# Patient Record
Sex: FEMALE | Race: WHITE | NOT HISPANIC OR LATINO | Employment: PART TIME | URBAN - METROPOLITAN AREA
[De-identification: names, ages, dates, MRNs, and addresses within clinical notes are randomized per-mention and may not be internally consistent; named-entity substitution may affect disease eponyms.]

---

## 2018-01-12 NOTE — PROGRESS NOTES
Assessment    1  UTI (urinary tract infection) (599 0) (N39 0)    Plan  Blood in urine, Burning with urination, Frequent urination    · Urine Dip Non-Automated- POC; Status:Complete;   Done: [id number] 02:40PM  UTI (urinary tract infection)    · Nitrofurantoin Monohyd Macro 100 MG Oral Capsule; TAKE 1 CAPSULE TWICE  DAILY UNTIL GONE   · Drink at least 6 glasses of clear liquids a day ; Status:Complete;   Done: 39CLU6833   · Drink plenty of fluids ; Status:Complete;   Done: 64TRT4146   · There are several things that can be done to treat and prevent bladder infections ;  Status:Complete;   Done: 01PJR5260   · Call [phone] if: The symptoms are not better in 2 days ; Status:Complete;   Done:  65FJQ4875   · Call [phone] if: The symptoms come back after the medications are finished ;  Status:Complete;   Done: 95COS2133   · Call [phone] if: You have pain in the kidney or low back area ; Status:Complete;    Done: 88DJY9177   · Call [phone] if: You see blood in your urine ; Status:Complete;   Done:  49XMS4408   · Call [phone] if: You start vomiting ; Status:Complete;   Done: 15SQT7564   · Call [phone] if: Your temperature is higher than 102F ; Status:Complete;   Done:  60QOT6513   · (1) URINE CULTURE; Source:Urine, Clean Catch; Status: In Progress - Specimen/Data  Collected;   Done: 71STL5056    Discussion/Summary    The patient has a urinary tract infection  She'll take antibiotic as ordered  She'll increase her fluids and rest  we will send her urine for culture as ordered  We'll followup with her if there's no improvement or if the condition worsens  She'll call there's no improvement by the end of the week  Possible side effects of new medications were reviewed with the patient/guardian today  The treatment plan was reviewed with the patient/guardian  The patient/guardian understands and agrees with the treatment plan      Chief Complaint  Pain and burning with urination  History of Present Illness  HPI: Alvy Peabody is a 78-year-old female presents today with urinary symptoms for 4 days  She is having increasing dysuria and increased frequency and urgency of urination  She is also having lower pelvic and low back discomfort  She does have some vaginal itching  She denies any nausea, vomiting, or fever  She denies any vaginal discharge  Her symptoms did seem to improve with increased fluid intake but came back this morning  Dysuria: Gurdeep Radford presents with complaints of dysuria  Associated symptoms include internal burning, urgency, frequency, hematuria, suprapubic pain, vaginal itching and lower back pain, but no fever, no chills, no vaginal discharge, no dyspareunia, no nocturia, no bladder spasm, no vaginal pain, no vulvar pain, no abdominal pain, no rectal pain, no nausea and no vomiting  Review of Systems    Constitutional: as noted in HPI    ENT: as noted in HPI  Cardiovascular: as noted in HPI  Respiratory: as noted in HPI  Breasts: as noted in HPI  Gastrointestinal: as noted in HPI  Genitourinary: as noted in HPI  Musculoskeletal: as noted in HPI  Integumentary: as noted in HPI  Neurological: as noted in HPI  Active Problems    1  Allergic rhinitis (477 9) (J30 9)   2  Chronic sinusitis (473 9) (J32 9)   3  Frequent urination (788 41) (R35 0)   4  Hyperlipidemia (272 4) (E78 5)   5  Insomnia (780 52) (G47 00)   6  Intermittent chest pain (786 50) (R07 9)   7  Need for diphtheria-tetanus-pertussis (Tdap) vaccine (V06 1) (Z23)   8  Need for influenza vaccination (V04 81) (Z23)   9  Screening for breast cancer (V76 10) (Z12 39)   10  Vitamin D deficiency (268 9) (E55 9)    Past Medical History    1  History of Denial (799 29) (R45 89)   2  History of Flu vaccine need (V04 81) (Z23)   3  History of acute sinusitis (V12 69) (Z87 09)   4  History of fatigue (V13 89) (Z87 898)   5   History of Vaginal candidiasis (112 1) (B37 3)  Active Problems And Past Medical History Reviewed: The active problems and past medical history were reviewed and updated today  Family History    1  Family history of EtOH dependence   2  Family history of pulmonary embolism (V17 49) (Z82 49)   3  Family history of valvular heart disease (V17 49) (Z82 49)   4  Family history of Mental disorder    5  Family history of Cancer    6  Family history of EtOH dependence   7  Family history of asthma (V17 5) (Z82 5)  Family History Reviewed: The family history was reviewed and updated today  Social History    · Always uses helmet   · Always uses seat belt   ·    · Never a smoker   · No alcohol use   · No drug use   · Occupation   ·    · Three children  The social history was reviewed and updated today  The social history was reviewed and is unchanged  Surgical History    1  History of Oral Surgery Tooth Extraction  Surgical History Reviewed: The surgical history was reviewed and updated today  Current Meds   1  Zolpidem Tartrate 10 MG Oral Tablet; TAKE 1 TABLET Bedtime; Therapy: 21ZKT2259 to (Mark Escobedo)  Requested for: 20QWE3021; Last   FV:[id number]; Status: ACTIVE - Renewal Denied Ordered    The medication list was reviewed and updated today  Allergies    1  No Known Drug Allergies    Vitals   Recorded: 66SSS7210 02:53PM   Temperature 98 8 F, Tympanic   Heart Rate 60   Respiration 16   Systolic 447, LUE   Diastolic 80, LUE   Height 5 ft 6 in   Weight 158 lb    BMI Calculated 25 5   BSA Calculated 1 81     Physical Exam    Constitutional   General appearance: No acute distress, well appearing and well nourished  Pulmonary   Respiratory effort: No increased work of breathing or signs of respiratory distress  Auscultation of lungs: Clear to auscultation  Cardiovascular   Auscultation of heart: Normal rate and rhythm, normal S1 and S2, without murmurs      Examination of extremities for edema and/or varicosities: Normal  Carotid pulses: Normal     Abdomen   Abdomen: Abnormal   The abdomen was flat  Bowel sounds were normal  Skin findings: no collateral vein dilation, no striae, no scars, no ostomy and no drain  There was moderate tenderness in the suprapubic area  There was a negative Hodges's sign, negative Rovsing's sign, negative obturator sign and negative psoas sign  no masses palpated  The abdomen was normal to percussion  no CVA tenderness   Liver and spleen: No hepatomegaly or splenomegaly  Lymphatic   Palpation of lymph nodes in neck: No lymphadenopathy           Results/Data  Urine Dip Non-Automated- POC [id number] 02:40PM [facility]     Test Name Result Flag Reference   Color Yellow     Clarity Transparent     Leukocytes +++     Nitrite Neg     Blood +++     Bilirubin Neg     Urobilinogen Normal     Protein Neg     Ph 5 0     Specific Gravity 1 010     Ketone Neg     Glucose Neg       Urine Dip Non-Automated- POC 82FNU1839 02:40PM [facility]     Test Name Result Flag Reference   Color Yellow     Clarity Transparent     Leukocytes +++     Nitrite Neg     Blood +++     Bilirubin Neg     Urobilinogen Normal     Protein Neg     Ph 5 0     Specific Gravity 1 010     Ketone Neg     Glucose Neg       Signatures   Electronically signed by : Baljeet Mcduffie DO; Jan 26 2016  5:09PM EST                       (Author)

## 2018-01-13 NOTE — RESULT NOTES
Message   The patient is currently on [address]       Verified Results  (1) URINE CULTURE 26Jan2016 12:00AM Sofia Herman     Test Name Result Flag Reference   Result 1 Escherichia coli A    25,000-50,000 colony forming units per mL   Urine Culture,Comprehensive Final report A    Antimicrobial Susceptibility Comment     ** S = Susceptible; I = Intermediate; R = Resistant **                     P = Positive; N = Negative              MICS are expressed in micrograms per mL     Antibiotic                 RSLT#1    RSLT#2    RSLT#3    RSLT#4  Amoxicillin/Clavulanic Acid    I  Ampicillin                     R  Cefazolin                      R  Cefepime                       S  Ceftriaxone                    S  Cefuroxime                     S  Cephalothin                    R  Ciprofloxacin                  S  Ertapenem                      S  Gentamicin                     S  Imipenem                       S  Levofloxacin                   S  Nitrofurantoin                 S  Piperacillin                   R  Tetracycline                   S  Tobramycin                     S  Trimethoprim/Sulfa             S       Signatures   Electronically signed by : Carroll Sosa DO; Jan 28 2016  4:18PM EST                       (Author)

## 2018-01-25 ENCOUNTER — OFFICE VISIT (OUTPATIENT)
Dept: URGENT CARE | Facility: CLINIC | Age: 53
End: 2018-01-25
Payer: COMMERCIAL

## 2018-01-25 VITALS
RESPIRATION RATE: 14 BRPM | HEART RATE: 96 BPM | SYSTOLIC BLOOD PRESSURE: 138 MMHG | HEIGHT: 66 IN | BODY MASS INDEX: 25.23 KG/M2 | WEIGHT: 157 LBS | DIASTOLIC BLOOD PRESSURE: 76 MMHG | TEMPERATURE: 100.1 F | OXYGEN SATURATION: 98 %

## 2018-01-25 DIAGNOSIS — R30.0 DYSURIA: ICD-10-CM

## 2018-01-25 DIAGNOSIS — N30.01 ACUTE CYSTITIS WITH HEMATURIA: Primary | ICD-10-CM

## 2018-01-25 LAB
SL AMB  POCT GLUCOSE, UA: NEGATIVE
SL AMB LEUKOCYTE ESTERASE,UA: ABNORMAL
SL AMB POCT BILIRUBIN,UA: NEGATIVE
SL AMB POCT BLOOD,UA: ABNORMAL
SL AMB POCT CLARITY,UA: ABNORMAL
SL AMB POCT COLOR,UA: YELLOW
SL AMB POCT KETONES,UA: NEGATIVE
SL AMB POCT NITRITE,UA: NEGATIVE
SL AMB POCT PH,UA: 8
SL AMB POCT SPECIFIC GRAVITY,UA: 1

## 2018-01-25 PROCEDURE — 87086 URINE CULTURE/COLONY COUNT: CPT | Performed by: PREVENTIVE MEDICINE

## 2018-01-25 PROCEDURE — 81002 URINALYSIS NONAUTO W/O SCOPE: CPT

## 2018-01-25 PROCEDURE — 99213 OFFICE O/P EST LOW 20 MIN: CPT

## 2018-01-25 RX ORDER — ZOLPIDEM TARTRATE 5 MG/1
5 TABLET ORAL
COMMUNITY
End: 2018-04-12 | Stop reason: SDUPTHER

## 2018-01-25 RX ORDER — SULFAMETHOXAZOLE AND TRIMETHOPRIM 800; 160 MG/1; MG/1
1 TABLET ORAL EVERY 12 HOURS SCHEDULED
Qty: 10 TABLET | Refills: 0 | Status: SHIPPED | OUTPATIENT
Start: 2018-01-25 | End: 2018-01-30

## 2018-01-26 ENCOUNTER — LAB REQUISITION (OUTPATIENT)
Dept: LAB | Facility: HOSPITAL | Age: 53
End: 2018-01-26
Payer: COMMERCIAL

## 2018-01-26 DIAGNOSIS — R30.0 DYSURIA: ICD-10-CM

## 2018-01-26 NOTE — PROGRESS NOTES
Assessment/Plan:      Diagnoses and all orders for this visit:    Acute cystitis with hematuria  -     sulfamethoxazole-trimethoprim (BACTRIM DS) 800-160 mg per tablet; Take 1 tablet by mouth every 12 (twelve) hours for 5 days    Dysuria  -     POCT urine dip auto non-scope  -     Urine culture    Other orders  -     zolpidem (AMBIEN) 5 mg tablet; Take 5 mg by mouth daily at bedtime as needed for sleep          Subjective:     Patient ID: Pedro Briones is a 46 y o  female  HPI    Review of Systems   Constitutional: Positive for fever  Genitourinary: Positive for difficulty urinating, dysuria and frequency  Negative for flank pain  No CVA tenderness to fist percussion      Objective:     Physical Exam

## 2018-01-26 NOTE — PATIENT INSTRUCTIONS
If your symptoms persist greater than 48 hours please call the clinic for the result of the urine culture  If over the next 48-72 hours you have increasing fever and developed back pain you must be recheck immediately

## 2018-01-27 LAB — BACTERIA UR CULT: NORMAL

## 2018-04-12 DIAGNOSIS — F51.01 PRIMARY INSOMNIA: Primary | ICD-10-CM

## 2018-04-12 RX ORDER — ZOLPIDEM TARTRATE 5 MG/1
5 TABLET ORAL
Qty: 30 TABLET | Refills: 0 | Status: SHIPPED | OUTPATIENT
Start: 2018-04-12 | End: 2018-06-01 | Stop reason: CLARIF

## 2018-06-01 DIAGNOSIS — G47.00 INSOMNIA, UNSPECIFIED TYPE: Primary | ICD-10-CM

## 2018-06-01 RX ORDER — ZOLPIDEM TARTRATE 10 MG/1
10 TABLET ORAL
Qty: 30 TABLET | Refills: 0 | Status: SHIPPED | OUTPATIENT
Start: 2018-06-01 | End: 2018-08-03 | Stop reason: SDUPTHER

## 2018-08-03 DIAGNOSIS — G47.00 INSOMNIA, UNSPECIFIED TYPE: ICD-10-CM

## 2018-08-03 RX ORDER — ZOLPIDEM TARTRATE 10 MG/1
10 TABLET ORAL
Qty: 30 TABLET | Refills: 1 | Status: SHIPPED | OUTPATIENT
Start: 2018-08-03 | End: 2018-09-19 | Stop reason: SDUPTHER

## 2018-09-19 ENCOUNTER — OFFICE VISIT (OUTPATIENT)
Dept: FAMILY MEDICINE CLINIC | Facility: CLINIC | Age: 53
End: 2018-09-19
Payer: COMMERCIAL

## 2018-09-19 VITALS
BODY MASS INDEX: 24.18 KG/M2 | TEMPERATURE: 99.4 F | HEART RATE: 64 BPM | SYSTOLIC BLOOD PRESSURE: 160 MMHG | WEIGHT: 149.8 LBS | DIASTOLIC BLOOD PRESSURE: 100 MMHG

## 2018-09-19 DIAGNOSIS — I10 ESSENTIAL HYPERTENSION: Primary | ICD-10-CM

## 2018-09-19 DIAGNOSIS — R53.83 FATIGUE, UNSPECIFIED TYPE: ICD-10-CM

## 2018-09-19 DIAGNOSIS — E55.9 VITAMIN D DEFICIENCY: ICD-10-CM

## 2018-09-19 DIAGNOSIS — G47.00 INSOMNIA, UNSPECIFIED TYPE: ICD-10-CM

## 2018-09-19 DIAGNOSIS — R50.9 FEVER OF UNKNOWN ORIGIN (FUO): ICD-10-CM

## 2018-09-19 DIAGNOSIS — M35.3 POLYMYALGIA (HCC): ICD-10-CM

## 2018-09-19 PROCEDURE — 1036F TOBACCO NON-USER: CPT | Performed by: FAMILY MEDICINE

## 2018-09-19 PROCEDURE — 99214 OFFICE O/P EST MOD 30 MIN: CPT | Performed by: FAMILY MEDICINE

## 2018-09-19 RX ORDER — ZOLPIDEM TARTRATE 10 MG/1
5 TABLET ORAL
Qty: 30 TABLET | Refills: 0 | Status: SHIPPED | OUTPATIENT
Start: 2018-09-19 | End: 2018-11-01 | Stop reason: SDUPTHER

## 2018-09-19 RX ORDER — AMLODIPINE BESYLATE AND BENAZEPRIL HYDROCHLORIDE 5; 10 MG/1; MG/1
1 CAPSULE ORAL DAILY
Qty: 30 CAPSULE | Refills: 5 | Status: SHIPPED | OUTPATIENT
Start: 2018-09-19 | End: 2019-02-21 | Stop reason: SDUPTHER

## 2018-09-19 NOTE — PROGRESS NOTES
Assessment/Plan:  1  Essential hypertension-the patient has new onset hypertension  We will start her on the amlodipine benazepril combination as ordered  It is possible that her blood pressure is causing a lot of her symptoms including the headaches and hot flashes  We will also send her for the lab work as ordered for further evaluation  2  Fatigue-the patient will go for lab work as ordered to look for underlying causes of her fatigue  3  Polymyalgia-the patient will go for the lab work as ordered to look for underlying causes of her symptoms  We will follow up with results when available  4  Vitamin-D deficiency-we will check an up-to-date vitamin-D level was ordered and the patient will continue with her vitamin-D supplementation  5  Fever of unknown origin-patient will go for the testing as ordered to evaluate for underlying causes  We will follow up with results when available  We will see her back as scheduled  Diagnoses and all orders for this visit:    Essential hypertension  -     CBC and differential; Future  -     Comprehensive metabolic panel; Future  -     LDL cholesterol, direct; Future  -     Lipid panel; Future  -     T4, free; Future  -     TSH, 3rd generation; Future  -     Lyme disease, western blot; Future  -     amLODIPine-benazepril (LOTREL 5-10) 5-10 MG per capsule; Take 1 capsule by mouth daily  -     CBC and differential  -     Comprehensive metabolic panel  -     LDL cholesterol, direct  -     Lipid panel  -     T4, free  -     TSH, 3rd generation  -     Lyme disease, western blot    Fatigue, unspecified type  -     CBC and differential; Future  -     Comprehensive metabolic panel; Future  -     LDL cholesterol, direct; Future  -     Lipid panel; Future  -     T4, free; Future  -     TSH, 3rd generation; Future  -     Lyme disease, western blot; Future  -     CYNTHIA Comprehensive Panel; Future  -     Rheumatoid Arthritis Factor; Future  -     Vitamin D 25 hydroxy;  Future  - Sedimentation rate, automated; Future  -     EBV acute panel; Future  -     EBV, Chronic/Active Infection; Future  -     Cyclic citrul peptide antibody, IgG; Future  -     CBC and differential  -     Comprehensive metabolic panel  -     LDL cholesterol, direct  -     Lipid panel  -     T4, free  -     TSH, 3rd generation  -     Lyme disease, western blot  -     CYNTHIA Comprehensive Panel  -     Rheumatoid Arthritis Factor  -     Vitamin D 25 hydroxy  -     Sedimentation rate, automated  -     EBV acute panel  -     EBV, Chronic/Active Infection  -     Cyclic citrul peptide antibody, IgG    Polymyalgia (HCC)  -     CBC and differential; Future  -     Comprehensive metabolic panel; Future  -     LDL cholesterol, direct; Future  -     Lipid panel; Future  -     T4, free; Future  -     TSH, 3rd generation; Future  -     Lyme disease, western blot; Future  -     CYNTHIA Comprehensive Panel; Future  -     Rheumatoid Arthritis Factor; Future  -     Vitamin D 25 hydroxy; Future  -     Sedimentation rate, automated; Future  -     EBV acute panel; Future  -     EBV, Chronic/Active Infection; Future  -     Cyclic citrul peptide antibody, IgG; Future  -     CBC and differential  -     Comprehensive metabolic panel  -     LDL cholesterol, direct  -     Lipid panel  -     T4, free  -     TSH, 3rd generation  -     Lyme disease, western blot  -     CYNTHIA Comprehensive Panel  -     Rheumatoid Arthritis Factor  -     Vitamin D 25 hydroxy  -     Sedimentation rate, automated  -     EBV acute panel  -     EBV, Chronic/Active Infection  -     Cyclic citrul peptide antibody, IgG    Vitamin D deficiency  -     Vitamin D 25 hydroxy; Future  -     Vitamin D 25 hydroxy    Fever of unknown origin (FUO)  -     CBC and differential; Future  -     Comprehensive metabolic panel; Future  -     LDL cholesterol, direct; Future  -     Lipid panel; Future  -     T4, free; Future  -     TSH, 3rd generation; Future  -     Lyme disease, western blot;  Future  - CYNTHIA Comprehensive Panel; Future  -     Rheumatoid Arthritis Factor; Future  -     Vitamin D 25 hydroxy; Future  -     Sedimentation rate, automated; Future  -     EBV acute panel; Future  -     EBV, Chronic/Active Infection; Future  -     Cyclic citrul peptide antibody, IgG; Future  -     CBC and differential  -     Comprehensive metabolic panel  -     LDL cholesterol, direct  -     Lipid panel  -     T4, free  -     TSH, 3rd generation  -     Lyme disease, western blot  -     CYNTHIA Comprehensive Panel  -     Rheumatoid Arthritis Factor  -     Vitamin D 25 hydroxy  -     Sedimentation rate, automated  -     EBV acute panel  -     EBV, Chronic/Active Infection  -     Cyclic citrul peptide antibody, IgG    Insomnia, unspecified type  -     zolpidem (AMBIEN) 10 mg tablet; Take 0 5 tablets (5 mg total) by mouth daily at bedtime as needed for sleep       Return in about 3 weeks (around 10/10/2018) for Recheck  Subjective:   Chief Complaint   Patient presents with    Hypertension     Elevated blod pressure, headaches, and low grade fevers for 3 months        Patient ID: Raghu Aceves is a 46 y o  female presents today for evaluation of elevated blood pressure, headaches, and low-grade fevers  Sharontonya Kendrick is a 46 y o  female who presents today with complaints of elevated blood pressure, headaches, and low-grade fevers for 3 months  She started with sinus congestion while on vacation 3 months ago  She was seen in an [facility] on 7/05/2018 in [place] and had a low grade fever and high BP at the time  They felt the high BP was related to the Excedrin she had taken for a headache  She started checking her BP at home- she bought a cuff  She eats a good diet- all organic and exercises  She feels achy and has headaches a lot  She feel shot a lot and flushed  She will feel achy all over  There is no further congestion  There is sinus pain  There are frequent unilateral headaches    She has not felt in 1 year  She has flu like symptoms  The headaches are almost every day  The patient denies any chest pain, shortness of breath, or palpitations  There is no edema  There are no headaches or visual changes  There is no lightheadedness, dizziness, or fainting spells  There is nausea and vomiting with the headaches and they last 12 hours  She has never been on BP medication  There are no rashes or joint swelling  The headaches are left sided  She is menopausal since 2012  Hypertension   This is a new problem  The current episode started more than 1 year ago  The problem has been gradually worsening since onset  The problem is uncontrolled  Associated symptoms include headaches, malaise/fatigue and neck pain  Pertinent negatives include no anxiety, blurred vision, chest pain, orthopnea, palpitations, peripheral edema, PND, shortness of breath or sweats  The following portions of the patient's history were reviewed and updated as appropriate: allergies, current medications, past family history, past medical history, past social history, past surgical history and problem list   Patient Active Problem List   Diagnosis    Allergic rhinitis    Chronic sinusitis    Hyperlipidemia    Insomnia    Vitamin D deficiency     No past medical history on file    Past Surgical History:   Procedure Laterality Date    WISDOM TOOTH EXTRACTION       No Known Allergies  Family History   Problem Relation Age of Onset    Alcohol abuse Mother     Pulmonary embolism Mother     Valvular heart disease Mother     Mental illness Mother     Cancer Father     Alcohol abuse Son     Asthma Son      Social History     Social History    Marital status: /Civil Union     Spouse name: N/A    Number of children: 3    Years of education: N/A     Occupational History           Social History Main Topics    Smoking status: Never Smoker    Smokeless tobacco: Never Used    Alcohol use No    Drug use: No    Sexual activity: Not on file     Other Topics Concern    Not on file     Social History Narrative    Always uses helmet     Always uses seat belt          Current Outpatient Prescriptions on File Prior to Visit   Medication Sig Dispense Refill    [DISCONTINUED] zolpidem (AMBIEN) 10 mg tablet Take 1 tablet (10 mg total) by mouth daily at bedtime as needed for sleep 30 tablet 1     No current facility-administered medications on file prior to visit  Review of Systems   Constitutional: Positive for malaise/fatigue  HENT: Negative  Eyes: Negative  Negative for blurred vision  Respiratory: Negative  Negative for shortness of breath  Cardiovascular: Negative  Negative for chest pain, palpitations, orthopnea and PND  Gastrointestinal: Negative  Endocrine: Negative  Genitourinary: Negative  Musculoskeletal: Positive for arthralgias, myalgias and neck pain  Skin: Negative  Allergic/Immunologic: Negative  Neurological: Positive for headaches  Hematological: Negative  Psychiatric/Behavioral: Negative  Objective:  Vitals:    09/19/18 1338 09/19/18 1417   BP: 170/100 160/100   BP Location: Right arm    Patient Position: Sitting    Cuff Size: Standard    Pulse: 80 64   Temp: 99 4 °F (37 4 °C)    TempSrc: Tympanic    Weight: 67 9 kg (149 lb 12 8 oz)      Body mass index is 24 18 kg/m²  Wt Readings from Last 3 Encounters:   09/19/18 67 9 kg (149 lb 12 8 oz)   01/25/18 71 2 kg (157 lb)   01/26/16 71 7 kg (158 lb)     Temp Readings from Last 3 Encounters:   09/19/18 99 4 °F (37 4 °C) (Tympanic)   01/25/18 100 1 °F (37 8 °C)   01/26/16 98 8 °F (37 1 °C) (Tympanic)     BP Readings from Last 3 Encounters:   09/19/18 160/100   01/25/18 138/76   01/26/16 130/80     Pulse Readings from Last 3 Encounters:   09/19/18 64   01/25/18 96   01/26/16 60        Physical Exam   Constitutional: She is oriented to person, place, and time  She appears well-developed and well-nourished  HENT:   Head: Normocephalic and atraumatic  Mouth/Throat: No oropharyngeal exudate  Eyes: Conjunctivae and EOM are normal  Pupils are equal, round, and reactive to light  Neck: Normal range of motion  No JVD present  No tracheal deviation present  No thyromegaly present  Cardiovascular: Normal rate, regular rhythm, normal heart sounds and intact distal pulses  Exam reveals no gallop and no friction rub  No murmur heard  Pulmonary/Chest: Effort normal and breath sounds normal  No stridor  No respiratory distress  She has no wheezes  She has no rales  She exhibits no tenderness  Abdominal: Soft  Bowel sounds are normal  She exhibits no distension and no mass  There is no tenderness  There is no rebound and no guarding  Musculoskeletal: Normal range of motion  She exhibits no edema, tenderness or deformity  Lymphadenopathy:     She has no cervical adenopathy  Neurological: She is alert and oriented to person, place, and time  She has normal reflexes  No cranial nerve deficit  She exhibits normal muscle tone  Coordination normal    Skin: Skin is warm and dry

## 2018-09-19 NOTE — PATIENT INSTRUCTIONS

## 2018-09-24 LAB
25(OH)D3+25(OH)D2 SERPL-MCNC: 61.2 NG/ML (ref 30–100)
ALBUMIN SERPL-MCNC: 4.6 G/DL (ref 3.5–5.5)
ALBUMIN/GLOB SERPL: 2.1 {RATIO} (ref 1.2–2.2)
ALP SERPL-CCNC: 57 IU/L (ref 39–117)
ALT SERPL-CCNC: 18 IU/L (ref 0–32)
AST SERPL-CCNC: 23 IU/L (ref 0–40)
B BURGDOR IGG PATRN SER IB-IMP: NEGATIVE
B BURGDOR IGM PATRN SER IB-IMP: NEGATIVE
B BURGDOR18KD IGG SER QL IB: NORMAL
B BURGDOR23KD IGG SER QL IB: NORMAL
B BURGDOR23KD IGM SER QL IB: NORMAL
B BURGDOR28KD IGG SER QL IB: NORMAL
B BURGDOR30KD IGG SER QL IB: NORMAL
B BURGDOR39KD IGG SER QL IB: NORMAL
B BURGDOR39KD IGM SER QL IB: NORMAL
B BURGDOR41KD IGG SER QL IB: NORMAL
B BURGDOR41KD IGM SER QL IB: NORMAL
B BURGDOR45KD IGG SER QL IB: NORMAL
B BURGDOR58KD IGG SER QL IB: NORMAL
B BURGDOR66KD IGG SER QL IB: NORMAL
B BURGDOR93KD IGG SER QL IB: NORMAL
BASOPHILS # BLD AUTO: 0 X10E3/UL (ref 0–0.2)
BASOPHILS NFR BLD AUTO: 1 %
BILIRUB SERPL-MCNC: 0.5 MG/DL (ref 0–1.2)
BUN SERPL-MCNC: 9 MG/DL (ref 6–24)
BUN/CREAT SERPL: 9 (ref 9–23)
CALCIUM SERPL-MCNC: 9.7 MG/DL (ref 8.7–10.2)
CCP IGA+IGG SERPL IA-ACNC: 5 UNITS (ref 0–19)
CENTROMERE B AB SER-ACNC: <0.2 AI (ref 0–0.9)
CHLORIDE SERPL-SCNC: 103 MMOL/L (ref 96–106)
CHOLEST SERPL-MCNC: 271 MG/DL (ref 100–199)
CHOLEST/HDLC SERPL: 4 RATIO (ref 0–4.4)
CHROMATIN AB SERPL-ACNC: <0.2 AI (ref 0–0.9)
CO2 SERPL-SCNC: 23 MMOL/L (ref 20–29)
CREAT SERPL-MCNC: 0.97 MG/DL (ref 0.57–1)
CYTOLOGY CMNT CVX/VAG CYTO-IMP: ABNORMAL
DSDNA AB SER-ACNC: <1 IU/ML (ref 0–9)
EBV EA IGG SER-ACNC: 52.6 U/ML (ref 0–8.9)
EBV NA IGG SER IA-ACNC: 124 U/ML (ref 0–17.9)
EBV PATRN SPEC IB-IMP: ABNORMAL
EBV VCA IGG SER IA-ACNC: 184 U/ML (ref 0–17.9)
EBV VCA IGM SER IA-ACNC: <36 U/ML (ref 0–35.9)
ENA JO1 AB SER-ACNC: <0.2 AI (ref 0–0.9)
ENA RNP AB SER-ACNC: 0.4 AI (ref 0–0.9)
ENA SCL70 AB SER-ACNC: <0.2 AI (ref 0–0.9)
ENA SM AB SER-ACNC: <0.2 AI (ref 0–0.9)
ENA SS-A AB SER-ACNC: <0.2 AI (ref 0–0.9)
ENA SS-B AB SER-ACNC: <0.2 AI (ref 0–0.9)
EOSINOPHIL # BLD AUTO: 0 X10E3/UL (ref 0–0.4)
EOSINOPHIL NFR BLD AUTO: 1 %
ERYTHROCYTE [DISTWIDTH] IN BLOOD BY AUTOMATED COUNT: 13.6 % (ref 12.3–15.4)
ERYTHROCYTE [SEDIMENTATION RATE] IN BLOOD BY WESTERGREN METHOD: 7 MM/HR (ref 0–40)
GLOBULIN SER-MCNC: 2.2 G/DL (ref 1.5–4.5)
GLUCOSE SERPL-MCNC: 84 MG/DL (ref 65–99)
HCT VFR BLD AUTO: 40.6 % (ref 34–46.6)
HDLC SERPL-MCNC: 68 MG/DL
HGB BLD-MCNC: 13.3 G/DL (ref 11.1–15.9)
IMM GRANULOCYTES # BLD: 0 X10E3/UL (ref 0–0.1)
IMM GRANULOCYTES NFR BLD: 0 %
LDLC SERPL CALC-MCNC: 180 MG/DL (ref 0–99)
LDLC SERPL DIRECT ASSAY-MCNC: 190 MG/DL (ref 0–99)
LYMPHOCYTES # BLD AUTO: 1.5 X10E3/UL (ref 0.7–3.1)
LYMPHOCYTES NFR BLD AUTO: 40 %
MCH RBC QN AUTO: 29.2 PG (ref 26.6–33)
MCHC RBC AUTO-ENTMCNC: 32.8 G/DL (ref 31.5–35.7)
MCV RBC AUTO: 89 FL (ref 79–97)
MONOCYTES # BLD AUTO: 0.2 X10E3/UL (ref 0.1–0.9)
MONOCYTES NFR BLD AUTO: 6 %
NEUTROPHILS # BLD AUTO: 2 X10E3/UL (ref 1.4–7)
NEUTROPHILS NFR BLD AUTO: 52 %
PLATELET # BLD AUTO: 241 X10E3/UL (ref 150–379)
POTASSIUM SERPL-SCNC: 4.5 MMOL/L (ref 3.5–5.2)
PROT SERPL-MCNC: 6.8 G/DL (ref 6–8.5)
RBC # BLD AUTO: 4.56 X10E6/UL (ref 3.77–5.28)
RHEUMATOID FACT SERPL-ACNC: <10 IU/ML (ref 0–13.9)
SL AMB EGFR AFRICAN AMERICAN: 78 ML/MIN/1.73
SL AMB EGFR NON AFRICAN AMERICAN: 67 ML/MIN/1.73
SL AMB SEE BELOW:: NORMAL
SL AMB VLDL CHOLESTEROL CALC: 23 MG/DL (ref 5–40)
SODIUM SERPL-SCNC: 142 MMOL/L (ref 134–144)
T4 FREE SERPL-MCNC: 1.19 NG/DL (ref 0.82–1.77)
TRIGL SERPL-MCNC: 117 MG/DL (ref 0–149)
TSH SERPL DL<=0.005 MIU/L-ACNC: 1.91 UIU/ML (ref 0.45–4.5)
WBC # BLD AUTO: 3.8 X10E3/UL (ref 3.4–10.8)

## 2018-10-05 ENCOUNTER — TELEPHONE (OUTPATIENT)
Dept: FAMILY MEDICINE CLINIC | Facility: CLINIC | Age: 53
End: 2018-10-05

## 2018-10-06 NOTE — TELEPHONE ENCOUNTER
I called the patient on 10/5/2018 at 6:30 p m  And reviewed the results of her recent blood work  Office Visit on 09/19/2018   Component Date Value    White Blood Cell Count 09/21/2018 3 8     Red Blood Cell Count 09/21/2018 4 56     Hemoglobin 09/21/2018 13 3     HCT 09/21/2018 40 6     MCV 09/21/2018 89     MCH 09/21/2018 29 2     MCHC 09/21/2018 32 8     RDW 09/21/2018 13 6     Platelet Count 98/12/1356 241     Neutrophils 09/21/2018 52     Lymphocytes 09/21/2018 40     Monocytes 09/21/2018 6     Eosinophils 09/21/2018 1     Basophils Relative 09/21/2018 1     Neutrophils (Absolute) 09/21/2018 2 0     Lymphocytes (Absolute) 09/21/2018 1 5     Monocytes (Absolute) 09/21/2018 0 2     Eosinophils (Absolute) 09/21/2018 0 0     Basophils (Absolute) 09/21/2018 0 0     Immature Granulocytes 09/21/2018 0     Immature Granulocytes (A* 09/21/2018 0 0     Glucose 09/21/2018 84     BUN 09/21/2018 9     Creatinine 09/21/2018 0 97     eGFR Non  09/21/2018 67     SL AMB EGFR  AMER* 09/21/2018 78     SL AMB BUN/CREATININE RA* 09/21/2018 9     Sodium 09/21/2018 142     SL AMB POTASSIUM 09/21/2018 4 5     Chloride 09/21/2018 103     CO2 09/21/2018 23     CALCIUM 09/21/2018 9 7     SL AMB PROTEIN, TOTAL 09/21/2018 6 8     Albumin 09/21/2018 4 6     Globulin, Total 09/21/2018 2 2     SL AMB ALBUMIN/GLOBULIN * 09/21/2018 2 1     TOTAL BILIRUBIN 09/21/2018 0 5     Alk Phos Isoenzymes 09/21/2018 57     SL AMB AST 09/21/2018 23     SL AMB ALT 09/21/2018 18     LDL Cholesterol 09/21/2018 190*    Cholesterol, Total 09/21/2018 271*    Triglycerides 09/21/2018 117     HDL 09/21/2018 68     SL AMB VLDL CHOLESTEROL * 09/21/2018 23     LDL Direct 09/21/2018 180*    Comment 09/21/2018 Comment     T   Chol/HDL Ratio 09/21/2018 4 0     Free t4 09/21/2018 1 19     TSH 09/21/2018 1 910     Lyme 93 kD IgG 09/21/2018 Absent     Lyme 66 kD IgG 09/21/2018 Absent     Lyme 58 kD IgG 09/21/2018 Absent     Lyme 45 kD IgG 09/21/2018 Absent     Lyme 41 kD IgG 09/21/2018 Absent     Lyme 39 kD IgG 09/21/2018 Absent     Lyme 30 kD IgG 09/21/2018 Absent     Lyme 28 kD IgG 09/21/2018 Absent     Lyme 23 kD IgG 09/21/2018 Absent     Lyme 18 kD IgG 09/21/2018 Absent     Lyme IgG WB Interp  09/21/2018 Negative     Lyme 41 kD IgM 09/21/2018 Absent     Lyme 39 kD IgM 09/21/2018 Absent     Lyme 23 kD IgM 09/21/2018 Absent     Lyme IgM WB Interp  09/21/2018 Negative     ds DNA Ab 09/21/2018 <1     ISACC RNP Ab 09/21/2018 0 4     ISACC Calle (SM) Ab 09/21/2018 <0 2     Scleroderma SCL-70 09/21/2018 <0 2     SS-A (RO) Ab 09/21/2018 <0 2     SS-B (LA) Ab 09/21/2018 <0 2     Antichromatin Abs 09/21/2018 <0 2     Anti ROSARIO-1 09/21/2018 <0 2     Anti-Centromere B Antibo* 09/21/2018 <0 2     See below: 09/21/2018 Comment     RA Latex Turbid 09/21/2018 <10 0     25-HYDROXY VIT D 09/21/2018 61 2     SL AMB SED RATE BY MODIF* 09/21/2018 7     EBV VCA IgM 09/21/2018 <36 0     EBV Early Antigen Ab, IgG 09/21/2018 52 6*    EBV VCA IgG 09/21/2018 184 0*    EBV Nuclear Ag Ab 09/21/2018 124 0*    EBV Interp  09/21/2018 Comment     Cyclic Citrullin Peptide* 09/21/2018 5       Her labs were all essentially normal   She did have evidence of what appears to be a subacute Jaclyn-Barr virus infection  This may explain her recent fatigue and body aches  She states that she is starting to feel better so this appears to be running its course  The only other abnormality was her marked hyperlipidemia  I recommended statin therapy to the patient but she is declining at this point  She wants to continue to work on diet and lifestyle modification  She is feeling better on her blood pressure medication and her blood pressure has improved  We will see her back in follow-up as scheduled on 10/22/2018    She is going to work on lifestyle modification for her cholesterol and we will re-evaluate in a few months

## 2018-11-01 DIAGNOSIS — G47.00 INSOMNIA, UNSPECIFIED TYPE: ICD-10-CM

## 2018-11-01 RX ORDER — ZOLPIDEM TARTRATE 10 MG/1
5 TABLET ORAL
Qty: 30 TABLET | Refills: 0 | Status: SHIPPED | OUTPATIENT
Start: 2018-11-01 | End: 2018-11-29 | Stop reason: SDUPTHER

## 2018-11-29 ENCOUNTER — TELEPHONE (OUTPATIENT)
Dept: FAMILY MEDICINE CLINIC | Facility: CLINIC | Age: 53
End: 2018-11-29

## 2018-11-29 DIAGNOSIS — G47.00 INSOMNIA, UNSPECIFIED TYPE: ICD-10-CM

## 2018-11-29 RX ORDER — ZOLPIDEM TARTRATE 10 MG/1
10 TABLET ORAL
Qty: 30 TABLET | Refills: 0 | Status: SHIPPED | OUTPATIENT
Start: 2018-11-29 | End: 2019-01-11 | Stop reason: SDUPTHER

## 2018-11-29 NOTE — TELEPHONE ENCOUNTER
I called and spoke with the patient on 11/29/2018 at 6:02 p m  Wan Rich Patient needs a new prescription for her zolpidem  She has been occasionally taking a whole tablet at bedtime  She ran out of her prescription for the 15 pills and they will not let her refill it sooner  I advised her that we will change to prescribing to 10 mg daily as needed Number 30 with no refills  We will monitor her closely and will see her back as scheduled

## 2018-11-29 NOTE — TELEPHONE ENCOUNTER
JANIS contacted the office because she ran out of her medication, ZOLPIDEM (AMBIEN) 10 MG TABLETS, which was refilled on 11/01/2018  She was prescribed 15 tablets and to take half a tablet daily  She stated there were a few days that she needed to take a full tablet and is now out of her prescription and the pharmacy states they are unable to refill the prescription until 12/02/2018 based on her insurance  She is out of medication  She also states that she will need a a longer prescription as she is going to be out of town on from 12/20 or 12/21/2018 until 01/05/2019  Janis can be contacted at   THANK YOU!

## 2019-01-11 DIAGNOSIS — G47.00 INSOMNIA, UNSPECIFIED TYPE: ICD-10-CM

## 2019-01-11 RX ORDER — ZOLPIDEM TARTRATE 10 MG/1
10 TABLET ORAL
Qty: 30 TABLET | Refills: 0 | Status: SHIPPED | OUTPATIENT
Start: 2019-01-11 | End: 2019-02-21 | Stop reason: SDUPTHER

## 2019-02-21 DIAGNOSIS — G47.00 INSOMNIA, UNSPECIFIED TYPE: ICD-10-CM

## 2019-02-21 DIAGNOSIS — I10 ESSENTIAL HYPERTENSION: ICD-10-CM

## 2019-02-21 RX ORDER — ZOLPIDEM TARTRATE 10 MG/1
10 TABLET ORAL
Qty: 30 TABLET | Refills: 0 | Status: SHIPPED | OUTPATIENT
Start: 2019-02-21 | End: 2019-03-25 | Stop reason: SDUPTHER

## 2019-02-21 RX ORDER — AMLODIPINE BESYLATE AND BENAZEPRIL HYDROCHLORIDE 5; 10 MG/1; MG/1
1 CAPSULE ORAL DAILY
Qty: 90 CAPSULE | Refills: 1 | Status: SHIPPED | OUTPATIENT
Start: 2019-02-21 | End: 2019-04-12 | Stop reason: SDUPTHER

## 2019-02-21 NOTE — TELEPHONE ENCOUNTER
Patient will be going to [facility] until 3/20/19, and is asking for seven extra pills since she will be past her refill date and run out of medication   Patient can be reached at [phone] if any questions and uses [facility] in [place]

## 2019-03-25 DIAGNOSIS — G47.00 INSOMNIA, UNSPECIFIED TYPE: ICD-10-CM

## 2019-03-25 RX ORDER — ZOLPIDEM TARTRATE 10 MG/1
10 TABLET ORAL
Qty: 30 TABLET | Refills: 0 | Status: SHIPPED | OUTPATIENT
Start: 2019-03-25 | End: 2019-05-06 | Stop reason: SDUPTHER

## 2019-04-12 DIAGNOSIS — I10 ESSENTIAL HYPERTENSION: ICD-10-CM

## 2019-04-12 RX ORDER — AMLODIPINE BESYLATE AND BENAZEPRIL HYDROCHLORIDE 5; 10 MG/1; MG/1
1 CAPSULE ORAL DAILY
Qty: 90 CAPSULE | Refills: 1 | Status: SHIPPED | OUTPATIENT
Start: 2019-04-12 | End: 2019-05-13 | Stop reason: SDUPTHER

## 2019-05-02 ENCOUNTER — TELEPHONE (OUTPATIENT)
Dept: FAMILY MEDICINE CLINIC | Facility: CLINIC | Age: 54
End: 2019-05-02

## 2019-05-04 DIAGNOSIS — G47.00 INSOMNIA, UNSPECIFIED TYPE: ICD-10-CM

## 2019-05-06 DIAGNOSIS — G47.00 INSOMNIA, UNSPECIFIED TYPE: ICD-10-CM

## 2019-05-06 RX ORDER — ZOLPIDEM TARTRATE 10 MG/1
TABLET ORAL
Qty: 30 TABLET | Refills: 0 | OUTPATIENT
Start: 2019-05-06

## 2019-05-06 RX ORDER — ZOLPIDEM TARTRATE 10 MG/1
10 TABLET ORAL
Qty: 30 TABLET | Refills: 0 | Status: SHIPPED | OUTPATIENT
Start: 2019-05-06 | End: 2019-06-04 | Stop reason: SDUPTHER

## 2019-05-13 DIAGNOSIS — I10 ESSENTIAL HYPERTENSION: ICD-10-CM

## 2019-05-13 RX ORDER — AMLODIPINE BESYLATE AND BENAZEPRIL HYDROCHLORIDE 5; 10 MG/1; MG/1
1 CAPSULE ORAL DAILY
Qty: 90 CAPSULE | Refills: 1 | Status: SHIPPED | OUTPATIENT
Start: 2019-05-13 | End: 2019-06-04 | Stop reason: SDUPTHER

## 2019-05-16 ENCOUNTER — OFFICE VISIT (OUTPATIENT)
Dept: FAMILY MEDICINE CLINIC | Facility: CLINIC | Age: 54
End: 2019-05-16
Payer: COMMERCIAL

## 2019-05-16 VITALS
HEIGHT: 66 IN | SYSTOLIC BLOOD PRESSURE: 110 MMHG | TEMPERATURE: 98.9 F | BODY MASS INDEX: 25.39 KG/M2 | HEART RATE: 95 BPM | DIASTOLIC BLOOD PRESSURE: 88 MMHG | WEIGHT: 158 LBS | OXYGEN SATURATION: 98 %

## 2019-05-16 DIAGNOSIS — Z12.4 SCREENING FOR CERVICAL CANCER: ICD-10-CM

## 2019-05-16 DIAGNOSIS — Z12.31 VISIT FOR SCREENING MAMMOGRAM: ICD-10-CM

## 2019-05-16 DIAGNOSIS — E78.2 MIXED HYPERLIPIDEMIA: ICD-10-CM

## 2019-05-16 DIAGNOSIS — R53.83 FATIGUE, UNSPECIFIED TYPE: ICD-10-CM

## 2019-05-16 DIAGNOSIS — F51.01 PRIMARY INSOMNIA: ICD-10-CM

## 2019-05-16 DIAGNOSIS — I10 BENIGN ESSENTIAL HTN: Primary | ICD-10-CM

## 2019-05-16 DIAGNOSIS — Z12.11 SCREENING FOR COLON CANCER: ICD-10-CM

## 2019-05-16 PROCEDURE — 3008F BODY MASS INDEX DOCD: CPT | Performed by: FAMILY MEDICINE

## 2019-05-16 PROCEDURE — 99214 OFFICE O/P EST MOD 30 MIN: CPT | Performed by: FAMILY MEDICINE

## 2019-05-19 PROBLEM — I10 BENIGN ESSENTIAL HTN: Status: ACTIVE | Noted: 2019-05-19

## 2019-06-04 ENCOUNTER — TELEPHONE (OUTPATIENT)
Dept: FAMILY MEDICINE CLINIC | Facility: CLINIC | Age: 54
End: 2019-06-04

## 2019-06-04 DIAGNOSIS — G47.00 INSOMNIA, UNSPECIFIED TYPE: ICD-10-CM

## 2019-06-04 DIAGNOSIS — I10 ESSENTIAL HYPERTENSION: ICD-10-CM

## 2019-06-04 RX ORDER — ZOLPIDEM TARTRATE 10 MG/1
10 TABLET ORAL
Qty: 90 TABLET | Refills: 1 | Status: SHIPPED | OUTPATIENT
Start: 2019-06-04 | End: 2019-12-12 | Stop reason: SDUPTHER

## 2019-06-04 RX ORDER — AMLODIPINE BESYLATE AND BENAZEPRIL HYDROCHLORIDE 5; 10 MG/1; MG/1
1 CAPSULE ORAL DAILY
Qty: 90 CAPSULE | Refills: 1 | Status: SHIPPED | OUTPATIENT
Start: 2019-06-04 | End: 2020-06-02 | Stop reason: SDUPTHER

## 2019-06-07 ENCOUNTER — OFFICE VISIT (OUTPATIENT)
Dept: URGENT CARE | Facility: CLINIC | Age: 54
End: 2019-06-07
Payer: COMMERCIAL

## 2019-06-07 VITALS
DIASTOLIC BLOOD PRESSURE: 72 MMHG | WEIGHT: 156 LBS | TEMPERATURE: 100.2 F | HEIGHT: 66 IN | HEART RATE: 102 BPM | SYSTOLIC BLOOD PRESSURE: 119 MMHG | RESPIRATION RATE: 16 BRPM | BODY MASS INDEX: 25.07 KG/M2

## 2019-06-07 DIAGNOSIS — R30.0 DYSURIA: ICD-10-CM

## 2019-06-07 DIAGNOSIS — N30.01 ACUTE CYSTITIS WITH HEMATURIA: Primary | ICD-10-CM

## 2019-06-07 LAB
SL AMB  POCT GLUCOSE, UA: NEGATIVE
SL AMB LEUKOCYTE ESTERASE,UA: ABNORMAL
SL AMB POCT BILIRUBIN,UA: NEGATIVE
SL AMB POCT BLOOD,UA: ABNORMAL
SL AMB POCT CLARITY,UA: ABNORMAL
SL AMB POCT COLOR,UA: ABNORMAL
SL AMB POCT KETONES,UA: NEGATIVE
SL AMB POCT NITRITE,UA: NEGATIVE
SL AMB POCT PH,UA: 5
SL AMB POCT SPECIFIC GRAVITY,UA: 1.03
SL AMB POCT URINE PROTEIN: NEGATIVE
SL AMB POCT UROBILINOGEN: 0.2

## 2019-06-07 PROCEDURE — 99213 OFFICE O/P EST LOW 20 MIN: CPT | Performed by: FAMILY MEDICINE

## 2019-06-07 PROCEDURE — 87077 CULTURE AEROBIC IDENTIFY: CPT | Performed by: PHYSICIAN ASSISTANT

## 2019-06-07 PROCEDURE — 87186 SC STD MICRODIL/AGAR DIL: CPT | Performed by: PHYSICIAN ASSISTANT

## 2019-06-07 PROCEDURE — 87086 URINE CULTURE/COLONY COUNT: CPT | Performed by: PHYSICIAN ASSISTANT

## 2019-06-07 RX ORDER — SULFAMETHOXAZOLE AND TRIMETHOPRIM 800; 160 MG/1; MG/1
1 TABLET ORAL 2 TIMES DAILY
Qty: 10 TABLET | Refills: 0 | Status: SHIPPED | OUTPATIENT
Start: 2019-06-07 | End: 2019-06-07

## 2019-06-07 RX ORDER — SULFAMETHOXAZOLE AND TRIMETHOPRIM 800; 160 MG/1; MG/1
1 TABLET ORAL 2 TIMES DAILY
Qty: 10 TABLET | Refills: 0 | Status: SHIPPED | OUTPATIENT
Start: 2019-06-07 | End: 2019-06-12

## 2019-06-09 LAB — BACTERIA UR CULT: ABNORMAL

## 2019-06-12 ENCOUNTER — TELEPHONE (OUTPATIENT)
Dept: URGENT CARE | Facility: CLINIC | Age: 54
End: 2019-06-12

## 2019-12-10 DIAGNOSIS — Z12.39 SCREENING FOR MALIGNANT NEOPLASM OF BREAST: Primary | ICD-10-CM

## 2019-12-12 DIAGNOSIS — G47.00 INSOMNIA, UNSPECIFIED TYPE: ICD-10-CM

## 2019-12-12 RX ORDER — ZOLPIDEM TARTRATE 10 MG/1
10 TABLET ORAL
Qty: 90 TABLET | Refills: 0 | Status: SHIPPED | OUTPATIENT
Start: 2019-12-12 | End: 2020-03-18 | Stop reason: SDUPTHER

## 2020-03-17 DIAGNOSIS — G47.00 INSOMNIA, UNSPECIFIED TYPE: ICD-10-CM

## 2020-03-17 RX ORDER — ZOLPIDEM TARTRATE 10 MG/1
TABLET ORAL
Qty: 90 TABLET | Refills: 0 | OUTPATIENT
Start: 2020-03-17

## 2020-03-18 DIAGNOSIS — G47.00 INSOMNIA, UNSPECIFIED TYPE: ICD-10-CM

## 2020-03-18 RX ORDER — ZOLPIDEM TARTRATE 10 MG/1
10 TABLET ORAL
Qty: 90 TABLET | Refills: 0 | Status: SHIPPED | OUTPATIENT
Start: 2020-03-18 | End: 2020-06-19 | Stop reason: SDUPTHER

## 2020-06-02 DIAGNOSIS — I10 ESSENTIAL HYPERTENSION: ICD-10-CM

## 2020-06-02 RX ORDER — AMLODIPINE BESYLATE AND BENAZEPRIL HYDROCHLORIDE 5; 10 MG/1; MG/1
1 CAPSULE ORAL DAILY
Qty: 90 CAPSULE | Refills: 0 | Status: SHIPPED | OUTPATIENT
Start: 2020-06-02 | End: 2020-08-30 | Stop reason: SDUPTHER

## 2020-06-15 DIAGNOSIS — G47.00 INSOMNIA, UNSPECIFIED TYPE: ICD-10-CM

## 2020-06-15 RX ORDER — ZOLPIDEM TARTRATE 10 MG/1
TABLET ORAL
Qty: 90 TABLET | Refills: 0 | OUTPATIENT
Start: 2020-06-15

## 2020-06-17 DIAGNOSIS — G47.00 INSOMNIA, UNSPECIFIED TYPE: ICD-10-CM

## 2020-06-17 RX ORDER — ZOLPIDEM TARTRATE 10 MG/1
10 TABLET ORAL
Qty: 30 TABLET | Refills: 0 | OUTPATIENT
Start: 2020-06-17

## 2020-06-19 ENCOUNTER — OFFICE VISIT (OUTPATIENT)
Dept: FAMILY MEDICINE CLINIC | Facility: CLINIC | Age: 55
End: 2020-06-19
Payer: COMMERCIAL

## 2020-06-19 VITALS
DIASTOLIC BLOOD PRESSURE: 88 MMHG | WEIGHT: 170.6 LBS | OXYGEN SATURATION: 96 % | HEART RATE: 68 BPM | TEMPERATURE: 99.6 F | RESPIRATION RATE: 18 BRPM | BODY MASS INDEX: 28.42 KG/M2 | SYSTOLIC BLOOD PRESSURE: 130 MMHG | HEIGHT: 65 IN

## 2020-06-19 DIAGNOSIS — Z11.4 SCREENING FOR HIV (HUMAN IMMUNODEFICIENCY VIRUS): ICD-10-CM

## 2020-06-19 DIAGNOSIS — Z11.59 ENCOUNTER FOR HEPATITIS C SCREENING TEST FOR LOW RISK PATIENT: ICD-10-CM

## 2020-06-19 DIAGNOSIS — Z12.39 SCREENING FOR BREAST CANCER: ICD-10-CM

## 2020-06-19 DIAGNOSIS — Z23 NEED FOR VACCINATION: ICD-10-CM

## 2020-06-19 DIAGNOSIS — E78.2 MIXED HYPERLIPIDEMIA: ICD-10-CM

## 2020-06-19 DIAGNOSIS — Z12.4 CERVICAL CANCER SCREENING: ICD-10-CM

## 2020-06-19 DIAGNOSIS — I10 BENIGN ESSENTIAL HTN: Primary | ICD-10-CM

## 2020-06-19 DIAGNOSIS — Z12.11 SCREENING FOR COLON CANCER: ICD-10-CM

## 2020-06-19 DIAGNOSIS — R53.83 FATIGUE, UNSPECIFIED TYPE: ICD-10-CM

## 2020-06-19 DIAGNOSIS — F51.01 PRIMARY INSOMNIA: ICD-10-CM

## 2020-06-19 DIAGNOSIS — E55.9 VITAMIN D DEFICIENCY: ICD-10-CM

## 2020-06-19 PROCEDURE — 3075F SYST BP GE 130 - 139MM HG: CPT | Performed by: FAMILY MEDICINE

## 2020-06-19 PROCEDURE — 1036F TOBACCO NON-USER: CPT | Performed by: FAMILY MEDICINE

## 2020-06-19 PROCEDURE — 3079F DIAST BP 80-89 MM HG: CPT | Performed by: FAMILY MEDICINE

## 2020-06-19 PROCEDURE — 99214 OFFICE O/P EST MOD 30 MIN: CPT | Performed by: FAMILY MEDICINE

## 2020-06-19 PROCEDURE — 3008F BODY MASS INDEX DOCD: CPT | Performed by: FAMILY MEDICINE

## 2020-06-19 PROCEDURE — 90471 IMMUNIZATION ADMIN: CPT | Performed by: FAMILY MEDICINE

## 2020-06-19 PROCEDURE — 90715 TDAP VACCINE 7 YRS/> IM: CPT | Performed by: FAMILY MEDICINE

## 2020-06-19 RX ORDER — ZOLPIDEM TARTRATE 10 MG/1
10 TABLET ORAL
Qty: 90 TABLET | Refills: 0 | Status: SHIPPED | OUTPATIENT
Start: 2020-06-19 | End: 2020-09-22 | Stop reason: SDUPTHER

## 2020-08-30 DIAGNOSIS — I10 ESSENTIAL HYPERTENSION: ICD-10-CM

## 2020-08-31 RX ORDER — AMLODIPINE BESYLATE AND BENAZEPRIL HYDROCHLORIDE 5; 10 MG/1; MG/1
1 CAPSULE ORAL DAILY
Qty: 90 CAPSULE | Refills: 1 | Status: SHIPPED | OUTPATIENT
Start: 2020-08-31 | End: 2020-09-22 | Stop reason: SDUPTHER

## 2020-09-22 ENCOUNTER — OFFICE VISIT (OUTPATIENT)
Dept: FAMILY MEDICINE CLINIC | Facility: CLINIC | Age: 55
End: 2020-09-22
Payer: COMMERCIAL

## 2020-09-22 VITALS
BODY MASS INDEX: 27.82 KG/M2 | WEIGHT: 167 LBS | SYSTOLIC BLOOD PRESSURE: 130 MMHG | HEIGHT: 65 IN | HEART RATE: 68 BPM | TEMPERATURE: 100.1 F | RESPIRATION RATE: 18 BRPM | DIASTOLIC BLOOD PRESSURE: 82 MMHG

## 2020-09-22 DIAGNOSIS — I10 BENIGN ESSENTIAL HTN: ICD-10-CM

## 2020-09-22 DIAGNOSIS — E55.9 VITAMIN D DEFICIENCY: ICD-10-CM

## 2020-09-22 DIAGNOSIS — I10 ESSENTIAL HYPERTENSION: ICD-10-CM

## 2020-09-22 DIAGNOSIS — Z11.59 ENCOUNTER FOR SCREENING FOR OTHER VIRAL DISEASES: ICD-10-CM

## 2020-09-22 DIAGNOSIS — R50.9 CHRONIC FEVER: Primary | ICD-10-CM

## 2020-09-22 DIAGNOSIS — F51.01 PRIMARY INSOMNIA: ICD-10-CM

## 2020-09-22 DIAGNOSIS — B27.90 CHRONIC EPSTEIN BARR VIRUS (EBV) INFECTION: ICD-10-CM

## 2020-09-22 DIAGNOSIS — E78.2 MIXED HYPERLIPIDEMIA: ICD-10-CM

## 2020-09-22 LAB
25(OH)D3+25(OH)D2 SERPL-MCNC: 45.4 NG/ML (ref 30–100)
ALBUMIN SERPL-MCNC: 4.6 G/DL (ref 3.8–4.9)
ALBUMIN/GLOB SERPL: 1.9 {RATIO} (ref 1.2–2.2)
ALP SERPL-CCNC: 62 IU/L (ref 39–117)
ALT SERPL-CCNC: 18 IU/L (ref 0–32)
APPEARANCE UR: CLEAR
AST SERPL-CCNC: 21 IU/L (ref 0–40)
BASOPHILS # BLD AUTO: 0 X10E3/UL (ref 0–0.2)
BASOPHILS NFR BLD AUTO: 1 %
BILIRUB SERPL-MCNC: 0.3 MG/DL (ref 0–1.2)
BILIRUB UR QL STRIP: NEGATIVE
BUN SERPL-MCNC: 9 MG/DL (ref 6–24)
BUN/CREAT SERPL: 10 (ref 9–23)
CALCIUM SERPL-MCNC: 9.4 MG/DL (ref 8.7–10.2)
CHLORIDE SERPL-SCNC: 105 MMOL/L (ref 96–106)
CHOLEST SERPL-MCNC: 251 MG/DL (ref 100–199)
CHOLEST/HDLC SERPL: 4.1 RATIO (ref 0–4.4)
CO2 SERPL-SCNC: 23 MMOL/L (ref 20–29)
COLOR UR: YELLOW
CREAT SERPL-MCNC: 0.86 MG/DL (ref 0.57–1)
EBV NA IGG SER IA-ACNC: 127 U/ML (ref 0–17.9)
EBV VCA IGG SER IA-ACNC: 146 U/ML (ref 0–17.9)
EBV VCA IGM SER IA-ACNC: <36 U/ML (ref 0–35.9)
EOSINOPHIL # BLD AUTO: 0.1 X10E3/UL (ref 0–0.4)
EOSINOPHIL NFR BLD AUTO: 1 %
ERYTHROCYTE [DISTWIDTH] IN BLOOD BY AUTOMATED COUNT: 12.5 % (ref 11.7–15.4)
GLOBULIN SER-MCNC: 2.4 G/DL (ref 1.5–4.5)
GLUCOSE SERPL-MCNC: 84 MG/DL (ref 65–99)
GLUCOSE UR QL: NEGATIVE
HCT VFR BLD AUTO: 39.9 % (ref 34–46.6)
HCV AB S/CO SERPL IA: <0.1 S/CO RATIO (ref 0–0.9)
HDLC SERPL-MCNC: 61 MG/DL
HGB BLD-MCNC: 13.2 G/DL (ref 11.1–15.9)
HGB UR QL STRIP: NEGATIVE
HIV 1+2 AB+HIV1 P24 AG SERPL QL IA: NON REACTIVE
IMM GRANULOCYTES # BLD: 0 X10E3/UL (ref 0–0.1)
IMM GRANULOCYTES NFR BLD: 0 %
INTERPRETATION: ABNORMAL
KETONES UR QL STRIP: NEGATIVE
LDLC SERPL CALC-MCNC: 167 MG/DL (ref 0–99)
LDLC SERPL DIRECT ASSAY-MCNC: 165 MG/DL (ref 0–99)
LEUKOCYTE ESTERASE UR QL STRIP: NEGATIVE
LYMPHOCYTES # BLD AUTO: 1.2 X10E3/UL (ref 0.7–3.1)
LYMPHOCYTES NFR BLD AUTO: 25 %
MCH RBC QN AUTO: 30 PG (ref 26.6–33)
MCHC RBC AUTO-ENTMCNC: 33.1 G/DL (ref 31.5–35.7)
MCV RBC AUTO: 91 FL (ref 79–97)
MICRO URNS: NORMAL
MONOCYTES # BLD AUTO: 0.3 X10E3/UL (ref 0.1–0.9)
MONOCYTES NFR BLD AUTO: 7 %
NEUTROPHILS # BLD AUTO: 3.1 X10E3/UL (ref 1.4–7)
NEUTROPHILS NFR BLD AUTO: 66 %
NITRITE UR QL STRIP: NEGATIVE
PH UR STRIP: 6.5 [PH] (ref 5–7.5)
PLATELET # BLD AUTO: 287 X10E3/UL (ref 150–450)
POTASSIUM SERPL-SCNC: 5.1 MMOL/L (ref 3.5–5.2)
PROT SERPL-MCNC: 7 G/DL (ref 6–8.5)
PROT UR QL STRIP: NEGATIVE
RBC # BLD AUTO: 4.4 X10E6/UL (ref 3.77–5.28)
SL AMB EGFR AFRICAN AMERICAN: 89 ML/MIN/1.73
SL AMB EGFR NON AFRICAN AMERICAN: 77 ML/MIN/1.73
SL AMB VLDL CHOLESTEROL CALC: 23 MG/DL (ref 5–40)
SODIUM SERPL-SCNC: 141 MMOL/L (ref 134–144)
SP GR UR: 1.02 (ref 1–1.03)
TRIGL SERPL-MCNC: 129 MG/DL (ref 0–149)
TSH SERPL DL<=0.005 MIU/L-ACNC: 1.2 UIU/ML (ref 0.45–4.5)
UROBILINOGEN UR STRIP-ACNC: 0.2 MG/DL (ref 0.2–1)
WBC # BLD AUTO: 4.6 X10E3/UL (ref 3.4–10.8)

## 2020-09-22 PROCEDURE — 99214 OFFICE O/P EST MOD 30 MIN: CPT | Performed by: FAMILY MEDICINE

## 2020-09-22 PROCEDURE — 1036F TOBACCO NON-USER: CPT | Performed by: FAMILY MEDICINE

## 2020-09-22 PROCEDURE — 3725F SCREEN DEPRESSION PERFORMED: CPT | Performed by: FAMILY MEDICINE

## 2020-09-22 PROCEDURE — 3079F DIAST BP 80-89 MM HG: CPT | Performed by: FAMILY MEDICINE

## 2020-09-22 PROCEDURE — 3075F SYST BP GE 130 - 139MM HG: CPT | Performed by: FAMILY MEDICINE

## 2020-09-22 RX ORDER — ZOLPIDEM TARTRATE 10 MG/1
10 TABLET ORAL
Qty: 90 TABLET | Refills: 0 | Status: SHIPPED | OUTPATIENT
Start: 2020-09-22 | End: 2020-12-31 | Stop reason: SDUPTHER

## 2020-09-22 RX ORDER — AMLODIPINE BESYLATE AND BENAZEPRIL HYDROCHLORIDE 5; 10 MG/1; MG/1
1 CAPSULE ORAL DAILY
Qty: 90 CAPSULE | Refills: 1 | Status: SHIPPED | OUTPATIENT
Start: 2020-09-22

## 2020-09-22 NOTE — PROGRESS NOTES
Assessment/Plan:  Problem List Items Addressed This Visit        Cardiovascular and Mediastinum    Benign essential HTN     The patient's blood pressure stable on her current medication  We have made no changes today  We refilled her medication today as ordered  She will continue with her healthy diet and trying to exercise  Other    Chronic Jaclyn Barr virus (EBV) infection    Relevant Orders    XR chest pa & lateral    US abdomen complete    Hyperlipidemia     We will follow up with the results of the up-to-date lipid panel that was drawn today  We will have further instructions at that time  Vitamin D deficiency     The patient will continue with her vitamin-D supplementation  We will see her back as scheduled  Other Visit Diagnoses     Chronic fever    -  Primary    Relevant Orders    XR chest pa & lateral    US abdomen complete    Lyme Antibody Profile with reflex to WB    Encounter for screening for other viral diseases        Relevant Orders    Novel Coronavirus (COVID-19), PCR [facility] - Collected at [facility] or [facility]    XR chest pa & lateral    US abdomen complete      The patient will go for the testing as ordered to evaluate her chronic fever  We will follow up very closely with the results  I did advise her to self quarantine as a precaution until we have the results back from her COVID-19 testing and to continue to wear her mask and social distance  We will follow up with the results of the testing and  have further instructions at that time  She will call with any worsening symptoms  Return if symptoms worsen or fail to improve-we will follow up with the results of the tests  Subjective:   Chief Complaint   Patient presents with    Follow-up     3 month checkup        Patient ID: Jcarlos Blanco is a 47 y o  female presents today for a routine checkup    Jcarlos Blanco is a 47 y o  female who presents today for follow-up of her hypertension, hyperlipidemia, vitamin-D deficiency, and history of chronic Jaclyn-Barr virus infection  She has problems with chronic Josias Shield Virus - she runs fevers all the time for the last 2 years and she always feels tried and feels lowsy  She feels warm all the time and her back feels hot  The patient denies any chest pain, shortness of breath, or palpitations  There is no edema  There are no headaches or visual changes  There is no lightheadedness, dizziness, or fainting spells  She has flu like symptoms all the time  There is a normal appetite  She is eating regularly  She will get pain in her calves  There is muscle pain and no joint pain  She is exercising and eating well  She had her lab work done today  She had migraine headaches- she has had none since then  There is normal abdominal pain- there is no nausea or vomiting  Her  is back at school  She is concerned about the [place] and not being able to sleep  Hypertension   This is a chronic problem  The problem is unchanged  The problem is controlled  Pertinent negatives include no anxiety, blurred vision, chest pain, headaches, malaise/fatigue, neck pain, orthopnea, palpitations, peripheral edema, PND, shortness of breath or sweats  Past treatments include calcium channel blockers and ACE inhibitors  The current treatment provides significant improvement  The following portions of the patient's history were reviewed and updated as appropriate: allergies, current medications, past family history, past medical history, past social history, past surgical history and problem list   Patient Active Problem List   Diagnosis    Allergic rhinitis    Chronic sinusitis    Hyperlipidemia    Insomnia    Vitamin D deficiency    Benign essential HTN    Chronic Jaclyn Barr virus (EBV) infection     History reviewed  No pertinent past medical history    Past Surgical History:   Procedure Laterality Date    WISDOM TOOTH EXTRACTION       No Known Allergies  Family History   Problem Relation Age of Onset    Alcohol abuse Mother     Pulmonary embolism Mother     Valvular heart disease Mother     Mental illness Mother     Cancer Father     Alcohol abuse Son     Asthma Son      Social History     Socioeconomic History    Marital status: /Civil Union     Spouse name: Not on file    Number of children: 3    Years of education: Not on file    Highest education level: Not on file   Occupational History    Occupation:     Social Needs    Financial resource strain: Not hard at all   Kenneth-Shelley insecurity     Worry: Never true     Inability: Never true    Transportation needs     Medical: No     Non-medical: No   Tobacco Use    Smoking status: Never Smoker    Smokeless tobacco: Never Used   Substance and Sexual Activity    Alcohol use: No    Drug use: No    Sexual activity: Not on file   Lifestyle    Physical activity     Days per week: 7 days     Minutes per session: 60 min    Stress: Very much   Relationships    Social connections     Talks on phone: Not on file     Gets together: Not on file     Attends Yazdanism service: Not on file     Active member of club or organization: Not on file     Attends meetings of clubs or organizations: Not on file     Relationship status:     Intimate partner violence     Fear of current or ex partner: No     Emotionally abused: No     Physically abused: No     Forced sexual activity: No   Other Topics Concern    Not on file   Social History Narrative    Always uses helmet     Always uses seat belt          No current outpatient medications on file prior to visit  No current facility-administered medications on file prior to visit  Review of Systems   Constitutional: Negative  Negative for malaise/fatigue  HENT: Negative  Eyes: Negative  Negative for blurred vision  Respiratory: Negative  Negative for shortness of breath      Cardiovascular: Negative  Negative for chest pain, palpitations, orthopnea and PND  Gastrointestinal: Negative  Endocrine: Negative  Genitourinary: Negative  Musculoskeletal: Negative  Negative for neck pain  Skin: Negative  Allergic/Immunologic: Negative  Neurological: Negative  Negative for headaches  Hematological: Negative  Psychiatric/Behavioral: Negative  Objective:  Vitals:    09/22/20 1451 09/22/20 1542   BP: 130/90 130/82   BP Location: Right arm    Patient Position: Sitting    Cuff Size: Standard    Pulse: 72 68   Resp: 18    Temp: 100 1 °F (37 8 °C)    TempSrc: Tympanic    Weight: 75 8 kg (167 lb)    Height: 5' 5" (1 651 m)      Body mass index is 27 79 kg/m²  Physical Exam  Constitutional:       Appearance: She is well-developed  HENT:      Head: Normocephalic and atraumatic  Mouth/Throat:      Pharynx: No oropharyngeal exudate  Eyes:      Conjunctiva/sclera: Conjunctivae normal       Pupils: Pupils are equal, round, and reactive to light  Neck:      Musculoskeletal: Normal range of motion  Thyroid: No thyromegaly  Vascular: No JVD  Trachea: No tracheal deviation  Cardiovascular:      Rate and Rhythm: Normal rate and regular rhythm  Heart sounds: Normal heart sounds  No murmur  No friction rub  No gallop  Pulmonary:      Effort: Pulmonary effort is normal  No respiratory distress  Breath sounds: Normal breath sounds  No stridor  No wheezing or rales  Chest:      Chest wall: No tenderness  Abdominal:      General: Bowel sounds are normal  There is no distension  Palpations: Abdomen is soft  There is no mass  Tenderness: There is no abdominal tenderness  There is no guarding or rebound  Musculoskeletal: Normal range of motion  General: No tenderness or deformity  Lymphadenopathy:      Cervical: No cervical adenopathy  Skin:     General: Skin is warm and dry     Neurological:      Mental Status: She is alert and oriented to person, place, and time  Cranial Nerves: No cranial nerve deficit  Motor: No abnormal muscle tone  Coordination: Coordination normal       Deep Tendon Reflexes: Reflexes are normal and symmetric   Reflexes normal

## 2020-09-25 NOTE — ASSESSMENT & PLAN NOTE
We will follow up with the results of the up-to-date lipid panel that was drawn today  We will have further instructions at that time

## 2020-09-25 NOTE — ASSESSMENT & PLAN NOTE
The patient's blood pressure stable on her current medication  We have made no changes today  We refilled her medication today as ordered  She will continue with her healthy diet and trying to exercise

## 2020-12-31 DIAGNOSIS — F51.01 PRIMARY INSOMNIA: ICD-10-CM

## 2020-12-31 RX ORDER — ZOLPIDEM TARTRATE 10 MG/1
10 TABLET ORAL
Qty: 30 TABLET | Refills: 0 | Status: SHIPPED | OUTPATIENT
Start: 2020-12-31 | End: 2021-02-03 | Stop reason: SDUPTHER

## 2021-01-20 ENCOUNTER — TELEPHONE (OUTPATIENT)
Dept: PSYCHIATRY | Facility: CLINIC | Age: 56
End: 2021-01-20

## 2021-02-02 DIAGNOSIS — F51.01 PRIMARY INSOMNIA: ICD-10-CM

## 2021-02-02 RX ORDER — ZOLPIDEM TARTRATE 10 MG/1
10 TABLET ORAL
Qty: 30 TABLET | Refills: 0 | Status: CANCELLED | OUTPATIENT
Start: 2021-02-02

## 2021-02-03 DIAGNOSIS — F51.01 PRIMARY INSOMNIA: ICD-10-CM

## 2021-02-03 RX ORDER — ZOLPIDEM TARTRATE 10 MG/1
10 TABLET ORAL
Qty: 30 TABLET | Refills: 0 | Status: SHIPPED | OUTPATIENT
Start: 2021-02-03 | End: 2021-02-05 | Stop reason: SDUPTHER

## 2021-02-04 RX ORDER — ZOLPIDEM TARTRATE 10 MG/1
TABLET ORAL
Qty: 90 TABLET | Refills: 0 | OUTPATIENT
Start: 2021-02-04

## 2021-02-05 DIAGNOSIS — F51.01 PRIMARY INSOMNIA: ICD-10-CM

## 2021-02-05 RX ORDER — ZOLPIDEM TARTRATE 10 MG/1
10 TABLET ORAL
Qty: 30 TABLET | Refills: 0 | Status: SHIPPED | OUTPATIENT
Start: 2021-02-05 | End: 2021-03-02 | Stop reason: SDUPTHER

## 2021-03-02 DIAGNOSIS — F51.01 PRIMARY INSOMNIA: ICD-10-CM

## 2021-03-02 RX ORDER — ZOLPIDEM TARTRATE 10 MG/1
10 TABLET ORAL
Qty: 30 TABLET | Refills: 0 | Status: SHIPPED | OUTPATIENT
Start: 2021-03-02 | End: 2021-04-05 | Stop reason: SDUPTHER

## 2021-04-05 DIAGNOSIS — F51.01 PRIMARY INSOMNIA: ICD-10-CM

## 2021-04-05 RX ORDER — ZOLPIDEM TARTRATE 10 MG/1
10 TABLET ORAL
Qty: 30 TABLET | Refills: 0 | Status: SHIPPED | OUTPATIENT
Start: 2021-04-05 | End: 2021-05-03 | Stop reason: SDUPTHER

## 2021-04-16 DIAGNOSIS — Z23 ENCOUNTER FOR IMMUNIZATION: ICD-10-CM

## 2021-05-03 DIAGNOSIS — F51.01 PRIMARY INSOMNIA: ICD-10-CM

## 2021-05-03 RX ORDER — ZOLPIDEM TARTRATE 10 MG/1
10 TABLET ORAL
Qty: 30 TABLET | Refills: 0 | Status: SHIPPED | OUTPATIENT
Start: 2021-05-03 | End: 2021-06-01 | Stop reason: SDUPTHER

## 2021-05-24 ENCOUNTER — VBI (OUTPATIENT)
Dept: ADMINISTRATIVE | Facility: OTHER | Age: 56
End: 2021-05-24

## 2021-06-01 DIAGNOSIS — F51.01 PRIMARY INSOMNIA: ICD-10-CM

## 2021-06-02 RX ORDER — ZOLPIDEM TARTRATE 10 MG/1
10 TABLET ORAL
Qty: 30 TABLET | Refills: 0 | Status: SHIPPED | OUTPATIENT
Start: 2021-06-02 | End: 2021-07-01 | Stop reason: SDUPTHER

## 2021-07-01 DIAGNOSIS — F51.01 PRIMARY INSOMNIA: ICD-10-CM

## 2021-07-02 RX ORDER — ZOLPIDEM TARTRATE 10 MG/1
10 TABLET ORAL
Qty: 30 TABLET | Refills: 0 | Status: SHIPPED | OUTPATIENT
Start: 2021-07-02 | End: 2021-07-21 | Stop reason: SDUPTHER

## 2021-07-09 ENCOUNTER — TELEPHONE (OUTPATIENT)
Dept: FAMILY MEDICINE CLINIC | Facility: CLINIC | Age: 56
End: 2021-07-09

## 2021-07-09 NOTE — TELEPHONE ENCOUNTER
Spoke with patient regarding need for prior-auth for zolpidem  Patient in the process of moving  Informed patient that a faxed form was  Sent and we will follow-up on Monday  Patient verbalized understanding and will call if she gets any updates

## 2021-07-19 ENCOUNTER — PATIENT MESSAGE (OUTPATIENT)
Dept: FAMILY MEDICINE CLINIC | Facility: CLINIC | Age: 56
End: 2021-07-19

## 2021-07-19 DIAGNOSIS — F51.01 PRIMARY INSOMNIA: ICD-10-CM

## 2021-07-21 RX ORDER — ZOLPIDEM TARTRATE 10 MG/1
10 TABLET ORAL
Qty: 30 TABLET | Refills: 0 | Status: SHIPPED | OUTPATIENT
Start: 2021-07-21

## 2021-08-24 ENCOUNTER — TELEPHONE (OUTPATIENT)
Dept: FAMILY MEDICINE CLINIC | Facility: CLINIC | Age: 56
End: 2021-08-24

## 2021-08-24 NOTE — TELEPHONE ENCOUNTER
Patient moved and I scanned the records release to patients chart  Could you remove the doctor from patients chart  Thank you very much

## 2021-09-07 ENCOUNTER — TELEPHONE (OUTPATIENT)
Dept: FAMILY MEDICINE CLINIC | Facility: CLINIC | Age: 56
End: 2021-09-07

## 2021-09-07 NOTE — TELEPHONE ENCOUNTER
Patient has moved out of the area  I scanned a copy of the patients release of records to patients chart  Could you remove doctor from patients chart? Thank you very much

## 2021-09-21 ENCOUNTER — TELEPHONE (OUTPATIENT)
Dept: FAMILY MEDICINE CLINIC | Facility: CLINIC | Age: 56
End: 2021-09-21

## 2021-09-21 NOTE — TELEPHONE ENCOUNTER
Patient moved to [facility]  Faxed request for information and scanned it to patients chart  Could you please remove the doctors name from patients chart? Thank you very much

## 2021-10-05 NOTE — TELEPHONE ENCOUNTER
10/05/21 1:01 PM     Thank you for your request  Your request has been received, reviewed, and the patient chart updated  The PCP has successfully been removed with a patient attribution note  This message will now be completed      Thank you  Mauricio Fisher

## 2021-10-12 NOTE — TELEPHONE ENCOUNTER
10/12/21 11:27 AM     Thank you for your request  Your request has been received, reviewed, and noted that it no longer requires attention; duplicate request  This message will now be completed      Thank you  Jordy Shivam

## 2021-11-01 NOTE — TELEPHONE ENCOUNTER
11/01/21 4:01 PM     Thank you for your request  Your request has been received, reviewed, and noted that it no longer requires attention; duplicate request  This message will now be completed      Thank you  Marilu Renae

## 2022-07-02 RX ORDER — DOXEPIN HYDROCHLORIDE 10 MG/1
CAPSULE ORAL
COMMUNITY

## 2022-07-02 RX ORDER — ZOLPIDEM TARTRATE 5 MG/1
TABLET ORAL
COMMUNITY

## 2022-07-02 RX ORDER — AMLODIPINE BESYLATE AND BENAZEPRIL HYDROCHLORIDE 5; 10 MG/1; MG/1
CAPSULE ORAL
COMMUNITY